# Patient Record
Sex: FEMALE | Race: WHITE | Employment: OTHER | URBAN - METROPOLITAN AREA
[De-identification: names, ages, dates, MRNs, and addresses within clinical notes are randomized per-mention and may not be internally consistent; named-entity substitution may affect disease eponyms.]

---

## 2017-01-04 ENCOUNTER — GENERIC CONVERSION - ENCOUNTER (OUTPATIENT)
Dept: OTHER | Facility: OTHER | Age: 72
End: 2017-01-04

## 2017-01-05 ENCOUNTER — GENERIC CONVERSION - ENCOUNTER (OUTPATIENT)
Dept: OTHER | Facility: OTHER | Age: 72
End: 2017-01-05

## 2017-01-12 ENCOUNTER — ALLSCRIPTS OFFICE VISIT (OUTPATIENT)
Dept: OTHER | Facility: OTHER | Age: 72
End: 2017-01-12

## 2017-01-16 ENCOUNTER — GENERIC CONVERSION - ENCOUNTER (OUTPATIENT)
Dept: OTHER | Facility: OTHER | Age: 72
End: 2017-01-16

## 2017-01-16 DIAGNOSIS — E03.9 HYPOTHYROIDISM: ICD-10-CM

## 2017-01-26 ENCOUNTER — ALLSCRIPTS OFFICE VISIT (OUTPATIENT)
Dept: OTHER | Facility: OTHER | Age: 72
End: 2017-01-26

## 2017-01-27 ENCOUNTER — GENERIC CONVERSION - ENCOUNTER (OUTPATIENT)
Dept: OTHER | Facility: OTHER | Age: 72
End: 2017-01-27

## 2017-02-02 ENCOUNTER — GENERIC CONVERSION - ENCOUNTER (OUTPATIENT)
Dept: OTHER | Facility: OTHER | Age: 72
End: 2017-02-02

## 2017-02-17 ENCOUNTER — LAB CONVERSION - ENCOUNTER (OUTPATIENT)
Dept: OTHER | Facility: OTHER | Age: 72
End: 2017-02-17

## 2017-02-17 LAB — TSH SERPL DL<=0.05 MIU/L-ACNC: 6.54 MIU/L (ref 0.4–4.5)

## 2017-02-20 ENCOUNTER — GENERIC CONVERSION - ENCOUNTER (OUTPATIENT)
Dept: OTHER | Facility: OTHER | Age: 72
End: 2017-02-20

## 2017-02-21 ENCOUNTER — APPOINTMENT (OUTPATIENT)
Dept: LAB | Facility: MEDICAL CENTER | Age: 72
End: 2017-02-21
Payer: MEDICARE

## 2017-02-21 ENCOUNTER — TRANSCRIBE ORDERS (OUTPATIENT)
Dept: ADMINISTRATIVE | Facility: HOSPITAL | Age: 72
End: 2017-02-21

## 2017-02-21 ENCOUNTER — ALLSCRIPTS OFFICE VISIT (OUTPATIENT)
Dept: OTHER | Facility: OTHER | Age: 72
End: 2017-02-21

## 2017-02-21 DIAGNOSIS — T78.3XXA GIANT URTICARIA, INITIAL ENCOUNTER: Primary | ICD-10-CM

## 2017-02-21 DIAGNOSIS — T78.3XXA GIANT URTICARIA, INITIAL ENCOUNTER: ICD-10-CM

## 2017-02-21 PROCEDURE — 36415 COLL VENOUS BLD VENIPUNCTURE: CPT

## 2017-02-21 PROCEDURE — 86162 COMPLEMENT TOTAL (CH50): CPT

## 2017-02-21 PROCEDURE — 86332 IMMUNE COMPLEX ASSAY: CPT

## 2017-02-21 PROCEDURE — 86160 COMPLEMENT ANTIGEN: CPT

## 2017-02-22 ENCOUNTER — TRANSCRIBE ORDERS (OUTPATIENT)
Dept: ADMINISTRATIVE | Facility: HOSPITAL | Age: 72
End: 2017-02-22

## 2017-02-22 DIAGNOSIS — R77.0 ABNORMALITY OF ALBUMIN: ICD-10-CM

## 2017-02-22 DIAGNOSIS — T70.3XXD: Primary | ICD-10-CM

## 2017-02-22 DIAGNOSIS — T78.3XXA ANGIONEUROTIC EDEMA: ICD-10-CM

## 2017-02-22 DIAGNOSIS — E88.09 OTHER DISORDERS OF PLASMA-PROTEIN METABOLISM, NOT ELSEWHERE CLASSIFIED: ICD-10-CM

## 2017-02-22 DIAGNOSIS — I10 ESSENTIAL (PRIMARY) HYPERTENSION: ICD-10-CM

## 2017-02-23 LAB
C1INH SERPL-MCNC: 34 MG/DL (ref 21–39)
CH50 SERPL-ACNC: 57 U/ML (ref 42–60)

## 2017-02-28 ENCOUNTER — HOSPITAL ENCOUNTER (OUTPATIENT)
Dept: RADIOLOGY | Facility: MEDICAL CENTER | Age: 72
Discharge: HOME | End: 2017-02-28
Payer: MEDICARE

## 2017-02-28 ENCOUNTER — HOSPITAL ENCOUNTER (OUTPATIENT)
Dept: CT IMAGING | Facility: HOSPITAL | Age: 72
Discharge: HOME | End: 2017-02-28
Payer: MEDICARE

## 2017-02-28 DIAGNOSIS — T70.3XXD: ICD-10-CM

## 2017-02-28 DIAGNOSIS — T78.3XXA ANGIONEUROTIC EDEMA: ICD-10-CM

## 2017-02-28 LAB — IC SERPL C1Q BIND-ACNC: <1.2 UG EQ/ML

## 2017-02-28 PROCEDURE — 70470 CT HEAD/BRAIN W/O & W/DYE: CPT

## 2017-02-28 PROCEDURE — 70491 CT SOFT TISSUE NECK W/DYE: CPT

## 2017-02-28 PROCEDURE — 71260 CT THORAX DX C+: CPT

## 2017-02-28 RX ADMIN — IOHEXOL 100 ML: 350 INJECTION, SOLUTION INTRAVENOUS at 13:08

## 2017-03-02 ENCOUNTER — GENERIC CONVERSION - ENCOUNTER (OUTPATIENT)
Dept: OTHER | Facility: OTHER | Age: 72
End: 2017-03-02

## 2017-03-07 ENCOUNTER — ALLSCRIPTS OFFICE VISIT (OUTPATIENT)
Dept: OTHER | Facility: OTHER | Age: 72
End: 2017-03-07

## 2017-03-13 ENCOUNTER — ALLSCRIPTS OFFICE VISIT (OUTPATIENT)
Dept: OTHER | Facility: OTHER | Age: 72
End: 2017-03-13

## 2017-03-16 ENCOUNTER — GENERIC CONVERSION - ENCOUNTER (OUTPATIENT)
Dept: OTHER | Facility: OTHER | Age: 72
End: 2017-03-16

## 2017-03-28 ENCOUNTER — LAB CONVERSION - ENCOUNTER (OUTPATIENT)
Dept: OTHER | Facility: OTHER | Age: 72
End: 2017-03-28

## 2017-03-28 LAB
BUN SERPL-MCNC: 12 MG/DL (ref 7–25)
BUN/CREA RATIO (HISTORICAL): NORMAL (CALC) (ref 6–22)
CALCIUM SERPL-MCNC: 9.4 MG/DL (ref 8.6–10.4)
CHLORIDE SERPL-SCNC: 103 MMOL/L (ref 98–110)
CO2 SERPL-SCNC: 27 MMOL/L (ref 20–31)
CREAT SERPL-MCNC: 0.8 MG/DL (ref 0.6–0.93)
EGFR AFRICAN AMERICAN (HISTORICAL): 85 ML/MIN/1.73M2
EGFR-AMERICAN CALC (HISTORICAL): 73 ML/MIN/1.73M2
GLUCOSE (HISTORICAL): 91 MG/DL (ref 65–99)
POTASSIUM SERPL-SCNC: 4.2 MMOL/L (ref 3.5–5.3)
PREALBUMIN (HISTORICAL): 16 MG/DL (ref 17–34)
SODIUM SERPL-SCNC: 138 MMOL/L (ref 135–146)

## 2017-03-29 ENCOUNTER — LAB CONVERSION - ENCOUNTER (OUTPATIENT)
Dept: OTHER | Facility: OTHER | Age: 72
End: 2017-03-29

## 2017-03-29 ENCOUNTER — GENERIC CONVERSION - ENCOUNTER (OUTPATIENT)
Dept: OTHER | Facility: OTHER | Age: 72
End: 2017-03-29

## 2017-03-29 LAB
CREATININE 24 HOUR UR (HISTORICAL): 1.38 G/24 H (ref 0.63–2.5)
PROT/CREAT UR: 130 MG/G CREAT
PROTEIN 24 HOUR URINE (HISTORICAL): 180 MG/24 H

## 2017-04-13 DIAGNOSIS — E03.9 HYPOTHYROIDISM: ICD-10-CM

## 2017-04-15 DIAGNOSIS — I10 ESSENTIAL (PRIMARY) HYPERTENSION: ICD-10-CM

## 2017-04-15 DIAGNOSIS — E03.9 HYPOTHYROIDISM: ICD-10-CM

## 2017-04-15 DIAGNOSIS — R80.9 PROTEINURIA: ICD-10-CM

## 2017-04-15 DIAGNOSIS — T78.3XXA ANGIONEUROTIC EDEMA: ICD-10-CM

## 2017-04-26 ENCOUNTER — ALLSCRIPTS OFFICE VISIT (OUTPATIENT)
Dept: OTHER | Facility: OTHER | Age: 72
End: 2017-04-26

## 2017-05-11 ENCOUNTER — ALLSCRIPTS OFFICE VISIT (OUTPATIENT)
Dept: OTHER | Facility: OTHER | Age: 72
End: 2017-05-11

## 2017-05-24 DIAGNOSIS — E03.9 HYPOTHYROIDISM: ICD-10-CM

## 2017-06-27 ENCOUNTER — GENERIC CONVERSION - ENCOUNTER (OUTPATIENT)
Dept: OTHER | Facility: OTHER | Age: 72
End: 2017-06-27

## 2018-01-10 NOTE — RESULT NOTES
Verified Results  * MAMMO SCREENING BILATERAL W CAD 21Jun2016 03:27PM Dixon Rogers     Test Name Result Flag Reference   VERENA [facility] SCREENING BILATERAL W CAD 06/21/2016        Summary / No summary entered :      No summary entered   Documents attached :      Donna Vargas;  Enc: [id number] - Chart Update - -      (Unassigned) (Result Document)

## 2018-01-11 NOTE — MISCELLANEOUS
Message   Recorded as Task   Date: 02/01/2017 12:33 PM, Created By: [id number]   Task Name: Call Back   Assigned To: Zo Little   Regarding Patient: Ignacio Oliver, Status: Active   Comment:    Julius Lawrence - 01 Feb 2017 12:33 PM     TASK CREATED  Caller: Self; [phone] (Home); [phone] (Work)  patient had a few questions about her condition   Meeker Na - 01 Feb 2017 1:58 PM     TASK EDITED  Patient called again  Claims she does not feel well  Offered her an appointment but she refused at this time  Meeker Na - 21 Feb 2017 10:28 AM     TASK EDITED  Patient called again this morning  Refused to provide details  Meeker Na - 69 Feb 2017 12:52 PM     TASK EDITED  call on cell phone [phone]   spoke with pt  she is now having sob on exertion  sob releived with rest  her ventolin inhaler helps  pt went to [facility] er lalst night  labs and chest xray were nomral  pt saw her cardiologist last friday  cardiac status nomral  pt asking about a ct scan  pt adivsed to call her allegist/asthma physiaian dr alves  pt to use her inhlaer as needed  pt aslo advised to report back to er if worsenign sob and/or cp   pt agrees  pt aslo stopped her simvastatin because she thinks this may be causing her problem        Signatures   Electronically signed by : Cherelle Perkins, [facility]; Feb 2 2017  1:05PM EST                       (Author)

## 2018-01-11 NOTE — MISCELLANEOUS
Message   Recorded as Task   Date: 03/01/2017 04:55 PM, Created By: Rod Aldridge   Task Name: Call Back   Assigned To: Rod Aldridge   Regarding Patient: Reji Strickland, Status: Active   CommentMorna Gen - 01 Mar 2017 4:55 PM     TASK CREATED  call pt  with  labs  and  ct scan results  spoke with pt  labs normal  ct scans show no malignancy  ct scan lungs shows small pulmonary nodules and emphysema  will need to repeat in one year  pt still having swelling  pt taking benadryl but not zrytec  pt to continue zyrtec and benadry   pt to stop metheanamine hippurate which contains famaldehyde  keep f/u appt with dr Ander Mckeon  pt advised ot report to er if swelling worsens and she develops sob      Signatures   Electronically signed by : Amando Ivy, [facility]; Mar  2 2017 12:56PM EST                       (Author)

## 2018-01-12 VITALS
BODY MASS INDEX: 37.74 KG/M2 | HEART RATE: 76 BPM | SYSTOLIC BLOOD PRESSURE: 156 MMHG | HEIGHT: 64 IN | DIASTOLIC BLOOD PRESSURE: 90 MMHG | WEIGHT: 221.06 LBS

## 2018-01-12 NOTE — RESULT NOTES
Verified Results  (1) THROAT CULTURE (CULTURE, UPPER RESPIRATORY) [id number] 08:25PM Salbador Tillman     Test Name Result Flag Reference   CLINICAL REPORT (Report)     Test:        Throat culture  Specimen Type:   Throat  Specimen Date:   9/21/2016 8:25 PM  Result Date:    9/23/2016 7:30 AM  Result Status:   Final result  Resulting Lab:    [address] [address]            Tel: [phone]      CULTURE                                       ------------------                                   Negative for beta-hemolytic Streptococcus

## 2018-01-13 VITALS
OXYGEN SATURATION: 97 % | BODY MASS INDEX: 37.49 KG/M2 | WEIGHT: 219.6 LBS | HEIGHT: 64 IN | TEMPERATURE: 98.7 F | HEART RATE: 76 BPM | DIASTOLIC BLOOD PRESSURE: 88 MMHG | SYSTOLIC BLOOD PRESSURE: 130 MMHG | RESPIRATION RATE: 16 BRPM

## 2018-01-13 VITALS
BODY MASS INDEX: 37.32 KG/M2 | OXYGEN SATURATION: 97 % | HEIGHT: 64 IN | HEART RATE: 86 BPM | DIASTOLIC BLOOD PRESSURE: 86 MMHG | SYSTOLIC BLOOD PRESSURE: 118 MMHG | TEMPERATURE: 98.5 F | RESPIRATION RATE: 16 BRPM | WEIGHT: 218.6 LBS

## 2018-01-13 NOTE — MISCELLANEOUS
Message   Recorded as Task   Date: 03/16/2017 09:17 AM, Created By: Cecilia Colby   Task Name: Call Back   Assigned To: Deb Main   Regarding Patient: Be Suh, Status: Active   Dot Largo - 16 Mar 2017 9:17 AM     TASK CREATED  Caller: Self; [phone] (Home); [phone] (Work)  [phone]    patient wanted to know if you could give her a call regarding her blood work   spokje with pt at length  she saw endocrinology for her uncontrolled hypothyroidism and edema  endo changed her thyroid med  endo does not feel the edema is from her hypothyroidism  pt's bp aslo high  pt stopped her lasix and potssium and restarted her losartan/hctz  pt feels she did not have angioedema from this med  pt states she also received a call from the allergist  he told her her to f/u with rheumatology  suspect federico was positve  lalbs that were back reviewed and show low serum protein and albumin  pt admits to fasting 2 dasy per week  discussed with Dr Harry Rendon  check 24 hour urine for protein  send pt labs slip via mail  pt advised ot monitor her bp a t home  f/u preet here as well  Plan  Abnormal albumin, Benign essential hypertension, Decreased serum protein level    · (1) BASIC METABOLIC PROFILE; Status:Active; Requested for:20Mar2017;    · (1) PREALBUMIN; Status:Active; Requested for:20Mar2017;    · (1) PROTEIN, URINE 24HR; Status:Active;  Requested for:20Mar2017;     Signatures   Electronically signed by : Christiano Holbrook, [facility]; Mar 20 2017 12:49PM EST                       (Author)

## 2018-01-14 VITALS
DIASTOLIC BLOOD PRESSURE: 82 MMHG | TEMPERATURE: 98.6 F | BODY MASS INDEX: 38.17 KG/M2 | HEIGHT: 64 IN | HEART RATE: 76 BPM | SYSTOLIC BLOOD PRESSURE: 122 MMHG | WEIGHT: 223.6 LBS | RESPIRATION RATE: 16 BRPM

## 2018-01-14 VITALS
BODY MASS INDEX: 35.73 KG/M2 | HEIGHT: 64 IN | DIASTOLIC BLOOD PRESSURE: 70 MMHG | WEIGHT: 209.31 LBS | HEART RATE: 76 BPM | SYSTOLIC BLOOD PRESSURE: 110 MMHG

## 2018-01-14 VITALS
BODY MASS INDEX: 36.98 KG/M2 | TEMPERATURE: 98.1 F | DIASTOLIC BLOOD PRESSURE: 80 MMHG | RESPIRATION RATE: 16 BRPM | HEART RATE: 85 BPM | HEIGHT: 64 IN | SYSTOLIC BLOOD PRESSURE: 132 MMHG | WEIGHT: 216.6 LBS | OXYGEN SATURATION: 97 %

## 2018-01-14 VITALS
RESPIRATION RATE: 16 BRPM | DIASTOLIC BLOOD PRESSURE: 70 MMHG | WEIGHT: 204.5 LBS | HEIGHT: 65 IN | HEART RATE: 80 BPM | TEMPERATURE: 97.6 F | BODY MASS INDEX: 34.07 KG/M2 | SYSTOLIC BLOOD PRESSURE: 110 MMHG

## 2018-01-15 NOTE — MISCELLANEOUS
Message   Recorded as Task   Date: 01/04/2017 04:57 PM, Created By: Willow Hurd   Task Name: Call Back   Assigned To: Stephany Moreno   Regarding Patient: Radha Fisher, Status: In Progress   Toimariama Maulik - 04 Jan 2017 4:57 PM     TASK CREATED  Caller: Self; Other; [phone] (Mobile Phone); [phone] (Work)  swelling from hands is progressing all over  what should she do? [address] [place] Jan 2017 1:00 PM     TASK EDITED  left  message for pt to  call    back  [address] [place] Jan 2017 1:00 PM     TASK IN PROGRESS   spokw with pt  she saw her allergist and today saw the cardiologist  she is on lsix and potassium  echo and labs ordered   pt will keep us apprised      Signatures   Electronically signed by : Buzz Burroughs, [facility]; Jan 5 2017  1:06PM EST                       (Author)

## 2018-01-15 NOTE — MISCELLANEOUS
Message   Recorded as Task   Date: 02/17/2017 07:43 AM, Created By: Phillip Issa   Task Name: Call Back   Assigned To: Phillip Issa   Regarding Patient: Pam Mccormick, Status: Active   CommentSheyarelis Omid - 17 Feb 2017 7:43 AM     TASK CREATED  tsh  is  still  high ,  need to increase  synthroid  spoke with pt  tsh is high  she needs her thyroid meds increased  pt has appt tomorrow with dr Steffen Mccullough  suggest increasing and changing levothyroxine to brand synthroid  pt agrees   will discuss toorrow at her appt      Signatures   Electronically signed by : Kathy Carrington, [facility]; Feb 20 2017 12:46PM EST                       (Author)

## 2018-01-16 NOTE — MISCELLANEOUS
Message   Recorded as Task   Date: 03/29/2017 12:06 PM, Created By: Frida Kay   Task Name: Call Back   Assigned To: Frida Kay   Regarding Patient: Clarke June, Status: In Progress   CommentGenell Verdugo - 29 Mar 2017 12:06 PM     TASK CREATED  call  pt  with labs  she needs  nephrology  consult   Frida Kay - 29 Mar 2017 2:26 PM     TASK EDITED  left  message  Frida Rahul - 29 Mar 2017 2:26 PM     TASK IN PROGRESS   spoke with pt  she saw rheumatology   he thinks she has sclerderma  pt being referred to gi to check esophagus and to pulmonary to check pft's  labs reviewed with pt bun/cr and gfr are nomral  24 hr urine for proteiin is high  albumin ois low  pt states het bp is still uncontrolled  pt will be referred to nephrology  Plan  Benign essential hypertension, Decreased serum protein level, Proteinuria    · 1 - Nancy LYLES, Sadie Horne (Nephrology) Physician Referral  Consult Only: the expectation is  that the referring provider will communicate back to the patient on treatment options  Evaluation and Treatment: the expectation is that the referred to provider will  communicate back to the patient on treatment options    Status: Active  Requested for:  [id number]  Care Summary provided  : Yes    Signatures   Electronically signed by : El Suarez, [facility]; Mar 30 2017  8:49AM EST                       (Author)

## 2018-01-17 NOTE — MISCELLANEOUS
Message   Recorded as Task   Date: 01/16/2017 11:22 AM, Created By: Collette Glad   Task Name: Call Back   Assigned To: Collette Glad   Regarding Patient: Po Lawrence, Status: Active   Chemaanh Martinez - 16 Jan 2017 11:22 AM     TASK CREATED  call  pt hear  tsh is high    she will need  her thyroid  meds increased  spoke to pt her tsh is elevated  will increase her levothyroxine to 150 mcgm  repeat tsh is 10 weeks      Plan  Hypothyroidism    · Levothyroxine Sodium 125 MCG Oral Tablet   · Levothyroxine Sodium 150 MCG Oral Tablet; Take 1 tablet daily   · (1) TSH; Status:Active;  Requested KBI:[id number];     Signatures   Electronically signed by : Jose Saucedo, [facility]; Jan 16 2017 12:53PM EST                       (Author)

## 2018-01-17 NOTE — RESULT NOTES
Verified Results  (Q) SED RATE BY MODIFIED Ayan Loredo [id number] 02:45PM Oneonta Snellen     Test Name Result Flag Reference   SED RATE BY MODIFIEDREGULO 14 mm/h  < OR = 30     (Q) ELVIRA IFA SCREEN W/REFL TO TITER AND PATTERN, IFA [id number] 02:45PM Orin Snellen     Test Name Result Flag Reference   ELVIRA SCREEN, IFA NEGATIVE  NEGATIVE     (1) RF QUANTITATION 26Oct2016 02:45PM Orin Snellen   REPORT COMMENT:  FASTING:NO     Test Name Result Flag Reference   RHEUMATOID FACTOR 7 IU/mL  <14

## 2018-01-17 NOTE — MISCELLANEOUS
Message   Recorded as Task   Date: 10/31/2016 10:39 AM, Created By: Kapil Olson   Task Name: Call Back   Assigned To: Megan Verdugo   Regarding Patient: Adeline Watson, Status: In Progress   CommentJohny Marcelo - 31 Oct 2016 10:39 AM     TASK CREATED  Caller: Self; Other; [phone] (Home); [phone] (Work)  wants to know what to do next? prednisone works and shes feeling better   Megan Verdugo - 02 Nov 2016 11:21 AM     TASK EDITED               left  message   Salomón Tilley Nov 2016 11:21 AM     TASK IN PROGRESS   Megan Verdugo - 03 Nov 2016 12:54 PM     TASK EDITED              left  message  Chris Babin   spoke w iht pt  she has oa of her hands  pt may take tylenol as needed  occasioal nsaid if needed        Signatures   Electronically signed by : Kalina Davis, [facility]; Nov  3 2016  2:22PM EST                       (Author)

## 2018-01-17 NOTE — MISCELLANEOUS
Message   Recorded as Task   Date: 10/26/2016 10:36 AM, Created By: Vicky Kearns   Task Name: Call Back   Assigned To: Megan Verdugo   Regarding Patient: Adeline Watson, Status: Active   Goyo Boys - 26 Oct 2016 10:36 AM     TASK CREATED  Caller: Self; General Medical Question; [phone] (Home); [phone] (Work)  THE PILLS YOU GAVE HER FOR HER HANDS ONLY HELPED A LITTLE BIT  STILL SWOLLEN AND PAINFUL  WHAT DOES SHE NEED TO DO NOW  PLEASE CALL [phone]   spoke with pt  lasix did not do much for her hand swelling  pt still having hand pain and swelling  pt o get sed rate a,na and rf  try po prednisone   pt agrees  Plan  Edema, Swelling of joint of left hand    · PredniSONE 10 MG Oral Tablet; TAKE 1 TABLET TWICE DAILY   · (1) ELVIRA SCREEN W/REFLEX TO TITER/PATTERN; Status:Active; Requested  for:26Oct2016;    · (1) RHEUMATOID FACTOR SCREEN; Status:Active; Requested for:26Oct2016;    · (1) SED RATE; Status:Active;  Requested [facility]:[id number];     Signatures   Electronically signed by : Kalina Davis, [facility]; Oct 26 2016  1:00PM EST                       (Author)

## 2018-01-23 NOTE — PROGRESS NOTES
Assessment   1  Medicare annual wellness visit, subsequent (V70 0) (Z00 00)    Plan  Benign essential hypertension, Extrinsic asthma, Hyperlipidemia, Hypothyroidism, Iron  deficiency anemia secondary to inadequate dietary iron intake    · (1) CBC/PLT/DIFF; Status:Active; Requested for:09Dec2016;    · (1) COMPREHENSIVE METABOLIC PANEL; Status:Active; Requested for:09Dec2016;    · (1) FERRITIN; Status:Active; Requested for:09Dec2016;    · (1) IRON; Status:Active; Requested for:09Dec2016;    · (1) LIPID PANEL, FASTING; Status:Active; Requested for:09Dec2016;    · (1) TSH; Status:Active; Requested for:09Dec2016;     Discussion/Summary    Pt continues to have hand pain dure to swelling    pt made an aopt with rheumatology  copy of labs given  f/u in april for chronic codntions  Impression: Subsequent Annual Wellness Visit, with preventive exam as well as age and risk appropriate counseling completed  Cardiovascular screening and counseling: screening is current  Diabetes screening and counseling: screening is current  Colorectal cancer screening and counseling: screening is current  Breast cancer screening and counseling: screening is current  Cervical cancer screening and counseling: screening not indicated  Glaucoma screening and counseling: optometrist referral    HIV screening and counseling: screening not indicated  Immunizations: influenza vaccine is up to date this year and the lifetime pneumococcal vaccine has been completed  Advance Directive Planning: not up to date  Chief Complaint  AWV      History of Present Illness  Welcome to Medicare and Wellness Visits: The patient is being seen for the subsequent annual wellness visit  Medicare Screening and Risk Factors   Hospitalizations: no previous hospitalizations  Once per lifetime medicare screening tests: ECG  Medicare Screening Tests Risk Questions   Osteoporosis risk assessment: , female gender and over 48years of age  HIV risk assessment: none indicated  Drug and Alcohol Use: The patient is a former cigarette smoker  The patient reports never drinking alcohol and non for 20 years  She has never used illicit drugs  Diet and Physical Activity: Current diet includes low fat food choices and low carbohydrate food choices  The patient does not exercise  Exercise: pt workss at [employer]  Mood Disorder and Cognitive Impairment Screening: She denies feeling down, depressed, or hopeless over the past two weeks  She denies feeling little interest or pleasure in doing things over the past two weeks  Cognitive impairment screening: denies difficulty learning/retaining new information, denies difficulty handling complex tasks, denies difficulty with reasoning, denies difficulty with spatial ability and orientation and denies difficulty with language  Functional Ability/Level of Safety: Hearing is normal bilaterally and a hearing aid is not used  The patient is currently able to do activities of daily living without limitations, able to do instrumental activities of daily living without limitations, able to participate in social activities without limitations and able to drive without limitations  Activities of daily living details: does not need help using the phone, no transportation help needed, does not need help shopping, no meal preparation help needed, does not need help doing housework, does not need help doing laundry, does not need help managing medications and does not need help managing money  Fall risk factors: The patient fell 0 times in the past 12 months , no antidepressant use and no antihypertensive use  Home safety risk factors:  no unfamiliar surroundings, no loose rugs, no poor household lighting, no uneven floors, no household clutter, grab bars in the bathroom and handrails on the stairs     Advance Directives: Advance directives: living will, durable power of  for health care directives and needs updating  Co-Managers and Medical Equipment/Suppliers: See Patient Care Team      Patient Care Team    Care Team Member Role Specialty Office Number   [facility] Boy[facility]  Physician Assistant [phone]   [address]  Cardiology [phone]     Review of Systems    Constitutional: no fever  Eyes: no eye pain  ENT: no earache and no sore throat  Cardiovascular: no chest pain and no lower extremity edema  Respiratory: no shortness of breath, no wheezing and no cough  Gastrointestinal: constipation, but no abdominal pain, no nausea, no vomiting, no diarrhea and no bright red blood per rectum  Genitourinary: no dysuria  Musculoskeletal: joint swelling, joint stiffness and pasin ansd swelling hands  Integumentary and Breasts: a rash  Neurological: no headache and no dizziness  Hematologic and Lymphatic: no tendency for easy bleeding and no tendency for easy bruising  Active Problems   1  Atopic dermatitis (691 8) (L20 9)  2  Benign essential hypertension (401 1) (I10)  3  Colon cancer screening (V76 51) (Z12 11)  4  Depression screen (V79 0) (Z13 89)  5  Edema (782 3) (R60 9)  6  Encounter for screening mammogram for malignant neoplasm of breast (V76 12)   (Z12 31)  7  Esophageal reflux (530 81) (K21 9)  8  Extrinsic asthma (493 00) (J45 909)  9  Fatigue (780 79) (R53 83)  10  Glaucoma Screening  11  Hyperlipidemia (272 4) (E78 5)  12  Hypothyroidism (244 9) (E03 9)  13  Initial Medicare annual wellness visit (V70 0) (Z00 00)  14  Iron deficiency anemia secondary to inadequate dietary iron intake (280 1) (D50 8)  15  Microscopic hematuria (599 72) (R31 29)  16  Need for immunization against influenza (V04 81) (Z23)  17  Need for Zostavax administration (V04 89) (Z23)  18  Screening for neurological condition (V80 09) (Z13 89)  19  Screening for other and unspecified genitourinary condition (V81 6) (Z13 89)  20  Stress incontinence, female (625 6) (N39 3)  21   Swelling of joint of left hand (719 04) (M25 442)  22  Swelling of joint of right hand (719 04) (M25 441)  23  Varicella vaccination (V05 4) (Z23)    Past Medical History    · Acute pharyngitis (462) (J02 9)   · History of Acute upper respiratory infection (465 9) (J06 9)   · History of Backache (724 5) (M54 9)   · History of Encounter for screening mammogram for malignant neoplasm of breast  (V76 12) (Z12 31)   · History of Need for pneumococcal vaccine (V03 82) (Z23)   · History of Nonvenomous Insect Bite Of The Right Leg (916 4)   · History of Screening for osteoporosis (V82 81) (Z13 820)   · History of Urinary tract infection (599 0) (N39 0)    Surgical History    · History of Pelvic Repair   · History of Pelvic Repair    Family History  Mother    · Family history of CAD (coronary artery disease)  Father    · Family history of CAD (coronary artery disease)    Social History    · Denied: Alcohol Use (History)   · Exercising Regularly   · Former smoker (S53 63) (S05 076)  The social history was reviewed and updated today  The social history was reviewed and is unchanged  Current Meds  1  Lansoprazole 30 MG Oral Capsule Delayed Release; TAKE ONE CAPSULE BY MOUTH   EVERY DAY; Therapy: 42SWY9457 to (Ayad Sjogren)  Requested for: 53GOQ6813; Last   Rx:30Nov2015 Ordered  2  Levothyroxine Sodium 125 MCG Oral Tablet; Take 1 tablet daily; Therapy: 19Pqa6944 to (Renew:24Jan2017)  Requested for: 74Ein5688; Last   Rx:53Lwx3637 Ordered  3  Losartan Potassium-HCTZ 50-12 5 MG Oral Tablet; Take 1 tablet daily; Therapy: 42Yrb2404 to (Ayad Sjogren)  Requested for: 60NLH1522; Last   Rx:30Nov2015 Ordered  4  Simvastatin 20 MG Oral Tablet; Take 1 tablet daily; Therapy: 46DAC4603 to (Ayad Sjogren)  Requested for: 84HCO4046; Last   Rx:30Nov2015 Ordered  5  Ventolin  (90 Base) MCG/ACT Inhalation Aerosol Solution; INHALE 1 TO 2   PUFFS EVERY 6 HOURS AS NEEDED;    Therapy: 66MHX0207 to (Last Rx:74Zqn1557)  Requested for: 58VXD4381 Ordered    The medication list was reviewed and updated today  Allergies   1  No Known Drug Allergies    Immunizations   1 2    Influenza  Approx 14OIX5583 11-Oct-2016    PCV  10-Oct-2014     Zoster  28-Apr-2016      Vitals  Signs    Temperature: 97 4 F, Tympanic  Heart Rate: 76, L Brachial Artery  Pulse Quality: Normal, L Brachial Artery  Respiration Quality: Normal  Respiration: 16  Systolic: 006, LUE, Sitting  Diastolic: 74, LUE, Sitting  Height: 5 ft 4 in  Weight: 223 lb 3 2 oz  BMI Calculated: 38 31  BSA Calculated: 2 05    Physical Exam    Constitutional   General appearance: Abnormal   well developed and obese  Head and Face   Head and face: Normal     Palpation of the face and sinuses: No sinus tenderness  Eyes   Conjunctiva and lids: No swelling, erythema or discharge  Pupils and irises: Equal, round, reactive to light  Ears, Nose, Mouth, and Throat   External inspection of ears and nose: Normal     Otoscopic examination: Tympanic membranes translucent with normal light reflex  Canals patent without erythema  Oropharynx: Normal with no erythema, edema, exudate or lesions  Neck   Neck: Supple, symmetric, trachea midline, no masses  Thyroid: Normal, no thyromegaly  Pulmonary   Respiratory effort: No increased work of breathing or signs of respiratory distress  Auscultation of lungs: Clear to auscultation  Cardiovascular   Auscultation of heart: Normal rate and rhythm, normal S1 and S2, no murmurs  Carotid pulses: 2+ bilaterally  Examination of extremities for edema and/or varicosities: Normal     Abdomen   Abdomen: Abnormal   The abdomen was obese  The abdomen was soft and nontender  Liver and spleen: No hepatomegaly or splenomegaly  Lymphatic   Palpation of lymph nodes in neck: No lymphadenopathy  Musculoskeletal   Digits and nails: Normal without clubbing or cyanosis  Examination of the extremities revealed no fingernail clubbing and well perfused fingers  Joints, bones, and muscles: Abnormal   pt has carpal swelling  right hand worse than left pipi and dip joints are ok  Skin   Skin and subcutaneous tissue: Normal without rashes or lesions  Psychiatric   Judgment and insight: Normal     Mood and affect: Normal        Health Management  Colon cancer screening   COLONOSCOPY; every 5 years; Last 20Gbn5526; Next Due: 64LSM0470;  Active    Signatures   Electronically signed by : [facility]; Dec  9 2016  3:49PM EST                       (Author)    Electronically signed by : Jojo Casas DO; Dec  9 2016  4:18PM EST                       (Author)

## 2018-02-12 ENCOUNTER — OFFICE VISIT (OUTPATIENT)
Dept: NEPHROLOGY | Facility: CLINIC | Age: 73
End: 2018-02-12
Payer: MEDICARE

## 2018-02-12 VITALS
HEART RATE: 80 BPM | BODY MASS INDEX: 30.42 KG/M2 | TEMPERATURE: 99.5 F | SYSTOLIC BLOOD PRESSURE: 120 MMHG | WEIGHT: 180 LBS | DIASTOLIC BLOOD PRESSURE: 60 MMHG

## 2018-02-12 DIAGNOSIS — R80.1 PERSISTENT PROTEINURIA: Primary | ICD-10-CM

## 2018-02-12 DIAGNOSIS — M34.9 SCLERODERMA (HCC): ICD-10-CM

## 2018-02-12 DIAGNOSIS — I10 BENIGN ESSENTIAL HYPERTENSION: ICD-10-CM

## 2018-02-12 PROBLEM — R80.9 PROTEINURIA: Status: ACTIVE | Noted: 2017-03-30

## 2018-02-12 PROCEDURE — 99213 OFFICE O/P EST LOW 20 MIN: CPT | Performed by: INTERNAL MEDICINE

## 2018-02-12 RX ORDER — HYDROCODONE BITARTRATE AND ACETAMINOPHEN 5; 300 MG/1; MG/1
TABLET ORAL EVERY 4 HOURS
COMMUNITY
Start: 2017-12-26

## 2018-02-12 RX ORDER — ACETAMINOPHEN 500 MG
500 TABLET ORAL
COMMUNITY

## 2018-02-12 RX ORDER — LOSARTAN POTASSIUM 25 MG/1
25 TABLET ORAL
COMMUNITY
Start: 2017-12-04

## 2018-02-12 RX ORDER — EPINEPHRINE 0.3 MG/.3ML
0.3 INJECTION SUBCUTANEOUS
COMMUNITY
Start: 2017-04-05

## 2018-02-12 RX ORDER — CETIRIZINE HYDROCHLORIDE 10 MG/1
10 TABLET ORAL
COMMUNITY

## 2018-02-12 RX ORDER — ERGOCALCIFEROL 1.25 MG/1
50000 CAPSULE ORAL WEEKLY
Refills: 2 | COMMUNITY
Start: 2017-12-14

## 2018-02-12 RX ORDER — FLUOXETINE 20 MG/1
20 TABLET, FILM COATED ORAL DAILY
COMMUNITY

## 2018-02-12 RX ORDER — FOLIC ACID 1 MG/1
1 TABLET ORAL
COMMUNITY
Start: 2017-11-28

## 2018-02-12 RX ORDER — LANSOPRAZOLE 30 MG/1
30 CAPSULE, DELAYED RELEASE ORAL
COMMUNITY

## 2018-02-12 RX ORDER — LEVOTHYROXINE SODIUM 0.12 MG/1
125 TABLET ORAL
COMMUNITY
Start: 2017-12-04

## 2018-02-12 RX ORDER — ATORVASTATIN CALCIUM 10 MG/1
10 TABLET, FILM COATED ORAL
COMMUNITY
Start: 2017-08-03

## 2018-02-12 RX ORDER — ZOLPIDEM TARTRATE 10 MG/1
1 TABLET ORAL
COMMUNITY

## 2018-02-12 RX ORDER — AMLODIPINE BESYLATE 5 MG/1
5 TABLET ORAL DAILY
COMMUNITY
Start: 2017-11-28

## 2018-02-12 NOTE — ASSESSMENT & PLAN NOTE
Do not have urine protein to creatinine ratio  Last urine analysis did not show any protein  I will order urine protein to creatinine ratio and monitor her    She is on ARB blocker which I will continue

## 2018-02-12 NOTE — ASSESSMENT & PLAN NOTE
Hypertension is very well control  I think is essential though it may be scleroderma related    She is being treated with ARB blocker which with drug of choice at this point and I will continue that

## 2018-02-12 NOTE — PROGRESS NOTES
NEPHROLOGY OFFICE FOLLOW UP  Ayse Jalloh 75 y o  female MRN: [id number]    Encounter: [id number] 2/12/2018    REASON FOR VISIT: Ayse Jalloh is a 75 y o  female who is here on 2/12/2018 for Follow-up    HPI:    José Krishna came in today for follow-up of scleroderma and proteinuria  Other than stage ointment pain and hand pain because of scleroderma she is doing well  She is being closely monitored by rheumatologist as well as specialist in [facility]  She is being treated with IgG IV at this point  Slowly improving though still has lot of pain        REVIEW OF SYSTEMS:    Review of Systems   Constitutional: Negative for activity change and fatigue  HENT: Negative for congestion and ear discharge  Eyes: Negative for photophobia and pain  Respiratory: Negative for apnea and choking  Cardiovascular: Negative for chest pain and palpitations  Gastrointestinal: Negative for abdominal distention and blood in stool  Endocrine: Negative for heat intolerance and polyphagia  Genitourinary: Negative for flank pain and urgency  Musculoskeletal: Positive for arthralgias  Negative for neck pain and neck stiffness  Skin: Negative for color change and wound  Allergic/Immunologic: Negative for food allergies and immunocompromised state  Neurological: Negative for seizures and facial asymmetry  Hematological: Negative for adenopathy  Does not bruise/bleed easily  Psychiatric/Behavioral: Negative for self-injury and suicidal ideas           PAST MEDICAL HISTORY:  Past Medical History:   Diagnosis Date    Chronic kidney disease     Hypertension     Protein in urine     Scleroderma ([address] )        PAST SURGICAL HISTORY:  Past Surgical History:   Procedure Laterality Date    TONSILLECTOMY Bilateral     TRACHEOSTOMY         SOCIAL HISTORY:  History   Alcohol Use No     History   Drug use: Unknown     History   Smoking Status    Former Smoker   Smokeless Tobacco    Never Used FAMILY HISTORY:  Family History   Problem Relation Age of Onset    Heart disease Mother     Heart disease Father     Heart disease Sister        MEDICATIONS:    Current Outpatient Prescriptions:     acetaminophen (TYLENOL) 500 mg tablet, Take 500 mg by mouth, Disp: , Rfl:     amLODIPine (NORVASC) 5 mg tablet, Take 5 mg by mouth daily, Disp: , Rfl:     atorvastatin (LIPITOR) 10 mg tablet, Take 10 mg by mouth daily at bedtime, Disp: , Rfl:     cetirizine (ZyrTEC) 10 mg tablet, Take 10 mg by mouth, Disp: , Rfl:     diclofenac sodium (VOLTAREN) 1 %, Apply topically, Disp: , Rfl:     EPINEPHrine (EPIPEN) 0 3 mg/0 3 mL SOAJ, Inject 0 3 mg into the shoulder, thigh, or buttocks, Disp: , Rfl:     ergocalciferol (VITAMIN D2) 50,000 units, 50,000 Units once a week, Disp: , Rfl: 2    FLUoxetine (PROzac) 20 MG tablet, Take 20 mg by mouth daily, Disp: , Rfl:     folic acid (FOLVITE) 1 mg tablet, Take 1 mg by mouth, Disp: , Rfl:     HYDROcodone-acetaminophen (XODOL) 5-300 MG per tablet, Take by mouth every 4 (four) hours, Disp: , Rfl:     lansoprazole (PREVACID) 30 mg capsule, Take 30 mg by mouth, Disp: , Rfl:     levothyroxine 125 mcg tablet, Take 125 mcg by mouth, Disp: , Rfl:     losartan (COZAAR) 25 mg tablet, Take 25 mg by mouth, Disp: , Rfl:     methotrexate (RHEUMATREX) 2 5 MG tablet, Take 20 mg by mouth Once a week, Disp: , Rfl:     Multiple Vitamins-Minerals (DAILY MULTI 50+) TABS, Take 1 tablet by mouth, Disp: , Rfl:     zolpidem (AMBIEN) 10 mg tablet, Take 1 tablet by mouth, Disp: , Rfl:     PHYSICAL EXAM:  Vitals:    02/12/18 0930   BP: 120/60   BP Location: Left arm   Patient Position: Sitting   Pulse: 80   Temp: 99 5 °F (37 5 °C)   TempSrc: Tympanic   Weight: 81 6 kg (180 lb)     Body mass index is 30 42 kg/m²  Physical Exam   Constitutional: She is oriented to person, place, and time  She appears well-developed  No distress  HENT:   Head: Normocephalic     Mouth/Throat: Oropharynx is clear and moist    Eyes: Conjunctivae are normal  No scleral icterus  Neck: Neck supple  No JVD present  Cardiovascular: Normal rate and normal heart sounds  Pulmonary/Chest: Effort normal  She has no wheezes  Abdominal: Soft  There is no tenderness  Musculoskeletal: Normal range of motion  She exhibits no edema  Neurological: She is alert and oriented to person, place, and time  Skin: Skin is warm  No rash noted  Psychiatric: She has a normal mood and affect  Her behavior is normal        LAB RESULTS:  Results for orders placed or performed in visit on 03/29/17   Protein, Total W/Creat, 24 Hour Urine (Historical)   Result Value Ref Range    CREATININE 24 HOUR UR 1 38 0 63 - 2 50 g/24 h    Prot/Creat Ratio, Ur 130 (H) < OR = 84 mg/g creat    PROTEIN 24 HOUR URINE 180 (H) <150 mg/24 h       ASSESSMENT and PLAN:      Scleroderma ([address] )  Being aggressively managed by rheumatologist as well as Adrian Arshad  Proteinuria  Do not have urine protein to creatinine ratio  Last urine analysis did not show any protein  I will order urine protein to creatinine ratio and monitor her  She is on ARB blocker which I will continue    Benign essential hypertension  Hypertension is very well control  I think is essential though it may be scleroderma related  She is being treated with ARB blocker which with drug of choice at this point and I will continue that        She is doing reasonably well  Advised her to continue what she is doing  I will see her back in 6 months  She will get blood and urine test before that visit      Portions of the record may have been created with voice recognition software  Occasional wrong word or "sound a like" substitutions may have occurred due to the inherent limitations of voice recognition software  Read the chart carefully and recognize, using context, where substitutions have occurred  If you have any questions, please contact the dictating provider

## 2018-02-12 NOTE — LETTER
February 12, 2018     [address]  [address] [place] [address]    Patient: Chinyere Orozco   YOB: 1945   Date of Visit: 2/12/2018       Dear Dr Vilchis Gains: Thank you for referring Chinyere Orozco to me for evaluation  Below are my notes for this consultation  If you have questions, please do not hesitate to call me  I look forward to following your patient along with you  Sincerely,        Brooklyn Aponte MD        CC: MD Brooklyn Mcnair MD  2/12/2018 10:05 AM  Sign at close encounter  [address] 75 y o  female MRN: [id number]    Encounter: [id number] 2/12/2018    REASON FOR VISIT: Chinyere Orozco is a 75 y o  female who is here on 2/12/2018 for Follow-up    HPI:    Carla Hylton came in today for follow-up of scleroderma and proteinuria  Other than stage ointment pain and hand pain because of scleroderma she is doing well  She is being closely monitored by rheumatologist as well as specialist in [facility]  She is being treated with IgG IV at this point  Slowly improving though still has lot of pain        REVIEW OF SYSTEMS:    Review of Systems   Constitutional: Negative for activity change and fatigue  HENT: Negative for congestion and ear discharge  Eyes: Negative for photophobia and pain  Respiratory: Negative for apnea and choking  Cardiovascular: Negative for chest pain and palpitations  Gastrointestinal: Negative for abdominal distention and blood in stool  Endocrine: Negative for heat intolerance and polyphagia  Genitourinary: Negative for flank pain and urgency  Musculoskeletal: Positive for arthralgias  Negative for neck pain and neck stiffness  Skin: Negative for color change and wound  Allergic/Immunologic: Negative for food allergies and immunocompromised state  Neurological: Negative for seizures and facial asymmetry     Hematological: Negative for adenopathy  Does not bruise/bleed easily  Psychiatric/Behavioral: Negative for self-injury and suicidal ideas           PAST MEDICAL HISTORY:  Past Medical History:   Diagnosis Date    Chronic kidney disease     Hypertension     Protein in urine     [address] [address]        PAST SURGICAL HISTORY:  Past Surgical History:   Procedure Laterality Date    TONSILLECTOMY Bilateral     TRACHEOSTOMY         SOCIAL HISTORY:  History   Alcohol Use No     History   Drug use: Unknown     History   Smoking Status    Former Smoker   Smokeless Tobacco    Never Used       FAMILY HISTORY:  Family History   Problem Relation Age of Onset    Heart disease Mother     Heart disease Father     Heart disease Sister        MEDICATIONS:    Current Outpatient Prescriptions:     acetaminophen (TYLENOL) 500 mg tablet, Take 500 mg by mouth, Disp: , Rfl:     amLODIPine (NORVASC) 5 mg tablet, Take 5 mg by mouth daily, Disp: , Rfl:     atorvastatin (LIPITOR) 10 mg tablet, Take 10 mg by mouth daily at bedtime, Disp: , Rfl:     cetirizine (ZyrTEC) 10 mg tablet, Take 10 mg by mouth, Disp: , Rfl:     diclofenac sodium (VOLTAREN) 1 %, Apply topically, Disp: , Rfl:     EPINEPHrine (EPIPEN) 0 3 mg/0 3 mL SOAJ, Inject 0 3 mg into the shoulder, thigh, or buttocks, Disp: , Rfl:     ergocalciferol (VITAMIN D2) 50,000 units, 50,000 Units once a week, Disp: , Rfl: 2    FLUoxetine (PROzac) 20 MG tablet, Take 20 mg by mouth daily, Disp: , Rfl:     folic acid (FOLVITE) 1 mg tablet, Take 1 mg by mouth, Disp: , Rfl:     HYDROcodone-acetaminophen (XODOL) 5-300 MG per tablet, Take by mouth every 4 (four) hours, Disp: , Rfl:     lansoprazole (PREVACID) 30 mg capsule, Take 30 mg by mouth, Disp: , Rfl:     levothyroxine 125 mcg tablet, Take 125 mcg by mouth, Disp: , Rfl:     losartan (COZAAR) 25 mg tablet, Take 25 mg by mouth, Disp: , Rfl:     methotrexate (RHEUMATREX) 2 5 MG tablet, Take 20 mg by mouth Once a week, Disp: , Rfl:     Multiple Vitamins-Minerals (DAILY MULTI 50+) TABS, Take 1 tablet by mouth, Disp: , Rfl:     zolpidem (AMBIEN) 10 mg tablet, Take 1 tablet by mouth, Disp: , Rfl:     PHYSICAL EXAM:  Vitals:    02/12/18 0930   BP: 120/60   BP Location: Left arm   Patient Position: Sitting   Pulse: 80   Temp: 99 5 °F (37 5 °C)   TempSrc: Tympanic   Weight: 81 6 kg (180 lb)     Body mass index is 30 42 kg/m²  Physical Exam   Constitutional: She is oriented to person, place, and time  She appears well-developed  No distress  HENT:   Head: Normocephalic  Mouth/Throat: Oropharynx is clear and moist    Eyes: Conjunctivae are normal  No scleral icterus  Neck: Neck supple  No JVD present  Cardiovascular: Normal rate and normal heart sounds  Pulmonary/Chest: Effort normal  She has no wheezes  Abdominal: Soft  There is no tenderness  Musculoskeletal: Normal range of motion  She exhibits no edema  Neurological: She is alert and oriented to person, place, and time  Skin: Skin is warm  No rash noted  Psychiatric: She has a normal mood and affect  Her behavior is normal        LAB RESULTS:  Results for orders placed or performed in visit on 03/29/17   Protein, Total W/Creat, 24 Hour Urine (Historical)   Result Value Ref Range    CREATININE 24 HOUR UR 1 38 0 63 - 2 50 g/24 h    Prot/Creat Ratio, Ur 130 (H) < OR = 84 mg/g creat    PROTEIN 24 HOUR URINE 180 (H) <150 mg/24 h       ASSESSMENT and PLAN:      Scleroderma ([address] )  Being aggressively managed by rheumatologist as well as Alfredo Verdugo  Proteinuria  Do not have urine protein to creatinine ratio  Last urine analysis did not show any protein  I will order urine protein to creatinine ratio and monitor her  She is on ARB blocker which I will continue    Benign essential hypertension  Hypertension is very well control  I think is essential though it may be scleroderma related    She is being treated with ARB blocker which with drug of choice at this point and I will continue that        She is doing reasonably well  Advised her to continue what she is doing  I will see her back in 6 months  She will get blood and urine test before that visit      Portions of the record may have been created with voice recognition software  Occasional wrong word or "sound a like" substitutions may have occurred due to the inherent limitations of voice recognition software  Read the chart carefully and recognize, using context, where substitutions have occurred  If you have any questions, please contact the dictating provider